# Patient Record
Sex: FEMALE | Race: WHITE | URBAN - METROPOLITAN AREA
[De-identification: names, ages, dates, MRNs, and addresses within clinical notes are randomized per-mention and may not be internally consistent; named-entity substitution may affect disease eponyms.]

---

## 2017-07-20 ENCOUNTER — HISTORICAL (OUTPATIENT)
Dept: SURGERY | Facility: HOSPITAL | Age: 67
End: 2017-07-20

## 2017-07-20 LAB
ABS NEUT (OLG): 4.8 X10(3)/MCL (ref 1.5–6.9)
ALBUMIN SERPL-MCNC: 4.6 GM/DL (ref 3.4–5)
ALBUMIN/GLOB SERPL: 1.2 RATIO
ALP SERPL-CCNC: 74 UNIT/L (ref 30–113)
ALT SERPL-CCNC: 27 UNIT/L (ref 10–45)
APTT PPP: 23.4 SECOND(S) (ref 25–35)
AST SERPL-CCNC: 18 UNIT/L (ref 15–37)
BASOPHILS # BLD AUTO: 0 X10(3)/MCL (ref 0–0.1)
BASOPHILS NFR BLD AUTO: 0 % (ref 0–1)
BILIRUB SERPL-MCNC: 0.4 MG/DL (ref 0.1–0.9)
BILIRUBIN DIRECT+TOT PNL SERPL-MCNC: 0.1 MG/DL (ref 0–0.3)
BILIRUBIN DIRECT+TOT PNL SERPL-MCNC: 0.3 MG/DL
BUN SERPL-MCNC: 27 MG/DL (ref 10–20)
CALCIUM SERPL-MCNC: 10.1 MG/DL (ref 8–10.5)
CHLORIDE SERPL-SCNC: 102 MMOL/L (ref 100–108)
CO2 SERPL-SCNC: 31 MMOL/L (ref 21–35)
CREAT SERPL-MCNC: 0.92 MG/DL (ref 0.7–1.3)
EOSINOPHIL # BLD AUTO: 0.5 X10(3)/MCL (ref 0–0.6)
EOSINOPHIL NFR BLD AUTO: 6 % (ref 0–5)
ERYTHROCYTE [DISTWIDTH] IN BLOOD BY AUTOMATED COUNT: 13 % (ref 11.5–17)
GLOBULIN SER-MCNC: 3.7 GM/DL
GLUCOSE SERPL-MCNC: 91 MG/DL (ref 75–116)
HCT VFR BLD AUTO: 39.4 % (ref 36–48)
HGB BLD-MCNC: 12.6 GM/DL (ref 12–16)
INR PPP: 1 (ref 0–1.2)
LYMPHOCYTES # BLD AUTO: 1.6 X10(3)/MCL (ref 0.5–4.1)
LYMPHOCYTES NFR BLD AUTO: 21 % (ref 15–40)
MCH RBC QN AUTO: 31 PG (ref 27–34)
MCHC RBC AUTO-ENTMCNC: 32 GM/DL (ref 31–36)
MCV RBC AUTO: 96 FL (ref 80–99)
MONOCYTES # BLD AUTO: 0.6 X10(3)/MCL (ref 0–1.1)
MONOCYTES NFR BLD AUTO: 8 % (ref 4–12)
NEUTROPHILS # BLD AUTO: 4.8 X10(3)/MCL (ref 1.5–6.9)
NEUTROPHILS NFR BLD AUTO: 64 % (ref 43–75)
PLATELET # BLD AUTO: 344 X10(3)/MCL (ref 140–400)
PMV BLD AUTO: 11.2 FL (ref 6.8–10)
POTASSIUM SERPL-SCNC: 4.9 MMOL/L (ref 3.6–5.2)
PROT SERPL-MCNC: 8.3 GM/DL (ref 6.4–8.2)
PROTHROMBIN TIME: 10.4 SECOND(S) (ref 9–12)
RBC # BLD AUTO: 4.09 X10(6)/MCL (ref 4.2–5.4)
SODIUM SERPL-SCNC: 139 MMOL/L (ref 135–145)
WBC # SPEC AUTO: 7.6 X10(3)/MCL (ref 4.5–11.5)

## 2017-07-26 ENCOUNTER — HISTORICAL (OUTPATIENT)
Dept: ANESTHESIOLOGY | Facility: HOSPITAL | Age: 67
End: 2017-07-26

## 2022-04-30 NOTE — OP NOTE
ADMITTING DIAGNOSIS:  Need for age-appropriate screening colonoscopy.    PROCEDURE:  Colonoscopy to the cecum.    POSTOPERATIVE DIAGNOSES:    1. Very poor prep inhibiting visibility of the colon.   2. Successful colonoscopy to the cecum; however, because of poor prep, visibility was poor requiring an air contrast barium enema to complete the study.    BRIEF HISTORY:  Patient is a 67-year-old  female with hypertension, hypercholesterolemia, and osteoarthritis.  Patient had heme-positive stools x3.  Referred to me by Dr. Leighton Griffin for endoscopic evaluation.  The patient never had a colonoscopy.  She is very high anxiety and, as a result, required significant sedation for her colonoscopy.    PROCEDURE IN DETAIL:  The patient was brought to the GI suite, underwent sedation and examination of the colon all the way to the cecum.  I was able to get to the cecum, but she was full of stool and had liquid-type stool and made visibility of the mucosa difficult.  An air contrast barium enema I think was necessary in order to be able to clear the ileocecal valve, which had stool on it, and had caked-on stool on the cecum that needed to be washed off.  The patient's ascending, transverse, descending, and rectosigmoid colon appeared to be normal with the exception of diverticulosis of the sigmoid colon.  There was stool throughout and, as a result, made visibility of the mucosa difficult, and I felt that an air contrast barium enema would be helpful in order to clear this area.  The patient will follow up with me in the office on Monday.  I appreciate the consultation referral from Dr. Leighton Griffin.        CASSIE/HARMEET   DD: 07/26/2017 1615   DT: 07/26/2017 1634  Job # [id number]    cc: Dr. Leighton Griffin